# Patient Record
Sex: FEMALE | Race: WHITE | Employment: FULL TIME | URBAN - METROPOLITAN AREA
[De-identification: names, ages, dates, MRNs, and addresses within clinical notes are randomized per-mention and may not be internally consistent; named-entity substitution may affect disease eponyms.]

---

## 2017-03-14 ENCOUNTER — OFFICE VISIT (OUTPATIENT)
Dept: OBGYN CLINIC | Age: 37
End: 2017-03-14
Payer: COMMERCIAL

## 2017-03-14 ENCOUNTER — HOSPITAL ENCOUNTER (OUTPATIENT)
Age: 37
Setting detail: SPECIMEN
Discharge: HOME | End: 2017-03-14
Payer: COMMERCIAL

## 2017-03-14 VITALS
DIASTOLIC BLOOD PRESSURE: 75 MMHG | BODY MASS INDEX: 22.88 KG/M2 | SYSTOLIC BLOOD PRESSURE: 110 MMHG | WEIGHT: 134 LBS | HEIGHT: 64 IN

## 2017-03-14 DIAGNOSIS — Z12.31 ENCOUNTER FOR MAMMOGRAM TO ESTABLISH BASELINE MAMMOGRAM: ICD-10-CM

## 2017-03-14 DIAGNOSIS — Z01.419 ENCOUNTER FOR GYNECOLOGICAL EXAMINATION WITHOUT ABNORMAL FINDING: Primary | ICD-10-CM

## 2017-03-14 PROCEDURE — 99395 PREV VISIT EST AGE 18-39: CPT | Performed by: OBSTETRICS & GYNECOLOGY

## 2017-03-21 LAB — CYTOLOGY REPORT: NORMAL

## 2017-03-29 ENCOUNTER — HOSPITAL ENCOUNTER (OUTPATIENT)
Dept: MAMMOGRAPHY | Age: 37
Discharge: HOME | End: 2017-03-29
Payer: COMMERCIAL

## 2017-03-29 DIAGNOSIS — Z12.31 ENCOUNTER FOR MAMMOGRAM TO ESTABLISH BASELINE MAMMOGRAM: ICD-10-CM

## 2017-03-29 PROCEDURE — 77063 BREAST TOMOSYNTHESIS BI: CPT

## 2017-12-05 RX ORDER — CIPROFLOXACIN 500 MG/1
TABLET, FILM COATED ORAL
Qty: 14 TABLET | Refills: 1 | Status: SHIPPED | OUTPATIENT
Start: 2017-12-05 | End: 2018-11-03 | Stop reason: SDUPTHER

## 2018-03-20 ENCOUNTER — HOSPITAL ENCOUNTER (OUTPATIENT)
Age: 38
Setting detail: SPECIMEN
Discharge: HOME | End: 2018-03-20
Payer: COMMERCIAL

## 2018-03-20 ENCOUNTER — OFFICE VISIT (OUTPATIENT)
Dept: OBGYN CLINIC | Age: 38
End: 2018-03-20
Payer: COMMERCIAL

## 2018-03-20 VITALS
HEIGHT: 64 IN | DIASTOLIC BLOOD PRESSURE: 62 MMHG | SYSTOLIC BLOOD PRESSURE: 102 MMHG | BODY MASS INDEX: 23.22 KG/M2 | WEIGHT: 136 LBS

## 2018-03-20 DIAGNOSIS — Z01.419 ENCOUNTER FOR GYNECOLOGICAL EXAMINATION WITHOUT ABNORMAL FINDING: Primary | ICD-10-CM

## 2018-03-20 DIAGNOSIS — N94.6 DYSMENORRHEA: ICD-10-CM

## 2018-03-20 PROCEDURE — 99395 PREV VISIT EST AGE 18-39: CPT | Performed by: OBSTETRICS & GYNECOLOGY

## 2018-03-20 RX ORDER — NAPROXEN SODIUM 550 MG/1
550 TABLET ORAL 2 TIMES DAILY WITH MEALS
Qty: 60 TABLET | Refills: 3 | Status: SHIPPED | OUTPATIENT
Start: 2018-03-20

## 2018-03-20 NOTE — PATIENT INSTRUCTIONS
I will send a prescription for your menstrual cramps to the pharmacy and begin taking them as directed. We will let you know the results of today's Pap smear. Return to the office in 1 year or as needed. Congratulations and good luck with your new job!

## 2018-03-20 NOTE — PROGRESS NOTES
of the skin revealed no rashes or lesions. Neck:  The neck was supple. There is no tracheal deviation, thyromegaly or supraclavicular adenopathy appreciated. Breast:   The patients breasts were symmetrical.  Breasts are nontender and there  were no masses, discharge or pathologic skin changes. There is no supraclavicular or axillary adenopathy bilaterally. Respiratory: There was unlabored respiratory effort. Lungs clear to ascultation without wheezes, rales or rhonchi in all fields bilaterally. Cardiovascular:  Normal sinus rhythm with a regular rate and without murmur, rubs or gallops. Abdomen: The abdomen was soft and non-tender with no guarding, rebound, CVAT or rigidity. No hernias were appreciated. Bowel sounds were normally active. Pelvic exam:  No vulvar, vaginal or cervical lesions are noted. Normal vaginal discharge present, no significant cystocele, rectocele or enterocele noted. Uterus nongravid and without CMT and adnexa nontender and without abnormal masses bilaterally. Extremities:  FROM and nontender without clubbing cyanosis or edema. ASSESSMENT:         ICD-10-CM ICD-9-CM    1. Encounter for gynecological examination without abnormal finding Z01.419 V72.31 PAP SMEAR   2. Dysmenorrhea N94.6 625.3                    PLAN:  We discussed conservative medical management with NSAIDs, continuous OCPs, patches and Depo-Provera. She is a previous user and Depo-Provera but discontinued due to decreased libido. We also discussed repeat ablation by HTA. She has decided on using NSAIDs and she was prescribed Anaprox DS. Return to the office in 1 year or when necessary. Patient was seen with total face to face time of 20 minutes. Jorge Luis Manzo M.D., Mph  [facility]  [place]

## 2018-04-05 LAB — CYTOLOGY REPORT: NORMAL

## 2018-11-05 RX ORDER — CIPROFLOXACIN 500 MG/1
TABLET, FILM COATED ORAL
Qty: 14 TABLET | Refills: 0 | Status: SHIPPED | OUTPATIENT
Start: 2018-11-05 | End: 2019-07-02 | Stop reason: SDUPTHER

## 2019-04-18 ENCOUNTER — OFFICE VISIT (OUTPATIENT)
Dept: OBGYN CLINIC | Age: 39
End: 2019-04-18
Payer: COMMERCIAL

## 2019-04-18 VITALS
DIASTOLIC BLOOD PRESSURE: 70 MMHG | HEIGHT: 64 IN | SYSTOLIC BLOOD PRESSURE: 120 MMHG | BODY MASS INDEX: 22.88 KG/M2 | WEIGHT: 134 LBS

## 2019-04-18 DIAGNOSIS — Z01.419 ENCOUNTER FOR GYNECOLOGICAL EXAMINATION WITHOUT ABNORMAL FINDING: Primary | ICD-10-CM

## 2019-04-18 PROCEDURE — 99395 PREV VISIT EST AGE 18-39: CPT | Performed by: OBSTETRICS & GYNECOLOGY

## 2019-04-18 ASSESSMENT — PATIENT HEALTH QUESTIONNAIRE - PHQ9
2. FEELING DOWN, DEPRESSED OR HOPELESS: 0
SUM OF ALL RESPONSES TO PHQ QUESTIONS 1-9: 0
1. LITTLE INTEREST OR PLEASURE IN DOING THINGS: 0
SUM OF ALL RESPONSES TO PHQ QUESTIONS 1-9: 0
SUM OF ALL RESPONSES TO PHQ9 QUESTIONS 1 & 2: 0

## 2019-04-18 NOTE — PROGRESS NOTES
DATE OF VISIT:  19        History and Physical    Anamika Bell    :  1980  CHIEF COMPLAINT:    Chief Complaint   Patient presents with    Annual Exam     Here for annual  Last pap 18nl  Last mammogram 17nl                    HPI :   Porfirio Trujillo is a 46 y.o. female. Nulligravida    Porfirio Trujillo returns today for her annual exam.  She has a history of endometrial ablation but continues to have 4-5 days of cyclic bleeding that is very light. Occasionally she does experience some discomfort along with bilateral breast discomfort. She is having regular bowel movements without constipation or diarrhea. She denies any involuntary loss of urine.   She is otherwise without any significant complaints today.  _____________________________________________________________________  Past Medical History:   Diagnosis Date    Painful menstrual periods                                                                    Past Surgical History:   Procedure Laterality Date    DILATION AND CURETTAGE OF UTERUS  14    with balloon ablation and Thermachoice    ENDOMETRIAL ABLATION      attempted only, not proper insrumentation    WISDOM TOOTH EXTRACTION       Family History   Problem Relation Age of Onset    Diabetes Paternal Grandfather     Diabetes Father     Birth Defects Maternal Grandmother         leukemia    Heart Disease Maternal Grandfather      Social History     Tobacco Use   Smoking Status Former Smoker    Packs/day: 1.00    Years: 7.00    Pack years: 7.00    Last attempt to quit: 2007    Years since quittin.2   Smokeless Tobacco Never Used     Social History     Substance and Sexual Activity   Alcohol Use Yes    Alcohol/week: 2.0 oz    Types: 4 Standard drinks or equivalent per week    Comment: 1 time per month     Current Outpatient Medications   Medication Sig Dispense Refill    ciprofloxacin (CIPRO) 500 MG tablet TAKE 1 TABLET BY MOUTH ONCE DAILY PRIOR TO INTERCOURSE AS NEEDED 14 tablet 0    CRANBERRY PO Take  by mouth.  calcium carbonate (OSCAL) 500 MG TABS tablet Take 500 mg by mouth daily.  naproxen sodium (ANAPROX DS) 550 MG tablet Take 1 tablet by mouth 2 times daily (with meals) 60 tablet 3     No current facility-administered medications for this visit. Allergies:  No Known Allergies    Gynecologic History:  Patient's last menstrual period was 2019. Sexually Active: Yes  STD History: No  Abnormal Pap History no  Birth Control: Yes , Davida    OB History    Para Term  AB Living   0 0 0 0 0 0   SAB TAB Ectopic Molar Multiple Live Births   0 0 0 0 0 0     ______________________________________________________________________  REVIEW OF SYSTEMS:        Constitutional:  Unexpected weight change no  Neurological:  Frequent headaches  no  Ophthalmic:  Recent visual changes no  ENT:   Difficulty swallowing  no  Breast:              Masses   no     Respiratory:  Shortness of breath  no    Cardiovascular: Chest pain   no     Gastrointestinal: Chronic diarrhea/constipation no   Urogenital:  Urinary incontinence  no                                         Heavy/irregular periods           no                                      Vaginal discharge                   no  Hematological: Bruises easy   no     Endocrine:  Nipple Discharge  no     Hot/Cold Intolerance  no   Psychological:  Mood and affect were wnl yes                                                                                                                                           Physical Exam:    Vitals:    19 1458   BP: 120/70   Site: Right Upper Arm   Position: Sitting   Cuff Size: Medium Adult   Weight: 134 lb (60.8 kg)   Height: 5' 4\" (1.626 m)       General Appearance:  She does not appear to be in any distress. This  is a well developed, well nourished, well groomed female.         Neurological:  The patient is alert and oriented to time, place, person, and situation without any noted sensory motor deficits. Skin:  A brief inspection of the skin revealed no rashes or lesions. Neck:  The neck was supple. There is no tracheal deviation, thyromegaly or supraclavicular adenopathy appreciated. Breast:   The patients breasts were symmetrical.  Breasts are nontender and there  were no masses, discharge or pathologic skin changes. There is no supraclavicular or axillary adenopathy bilaterally. Respiratory: There was unlabored respiratory effort. Lungs clear to ascultation without wheezes, rales or rhonchi in all fields bilaterally. Cardiovascular:  Normal sinus rhythm with a regular rate and without murmur, rubs or gallops. Abdomen: The abdomen was soft and non-tender with no guarding, rebound, CVAT or rigidity. No hernias were appreciated. Bowel sounds were normally active. Pelvic exam:  No vulvar, vaginal or cervical lesions are noted. Normal vaginal discharge present, no significant cystocele, rectocele or enterocele noted. Uterus nongravid and without CMT and adnexa nontender and without abnormal masses bilaterally. Extremities:  FROM and nontender without clubbing cyanosis or edema. ASSESSMENT:         ICD-10-CM    1. Encounter for gynecological examination without abnormal finding Z01.419 PAP SMEAR                   PLAN:    Return to the office in 1 year or when necessary  Patient was seen with total face to face time of 20 minutes. James Torres M.D., Mph  [facility]  [place]

## 2019-04-18 NOTE — PATIENT INSTRUCTIONS
Two reputable websites are the [employer] of obstetrics and gynecology and [employer].  We will contact you with the results of today's Pap smear. Return to the office in 1 year or as needed. Have a fantastic summer and wonderful year!

## 2019-04-25 DIAGNOSIS — Z01.419 ENCOUNTER FOR GYNECOLOGICAL EXAMINATION WITHOUT ABNORMAL FINDING: ICD-10-CM

## 2019-07-03 RX ORDER — CIPROFLOXACIN 500 MG/1
TABLET, FILM COATED ORAL
Qty: 14 TABLET | Refills: 0 | Status: SHIPPED | OUTPATIENT
Start: 2019-07-03 | End: 2020-01-20

## 2020-01-20 RX ORDER — CIPROFLOXACIN 500 MG/1
TABLET, FILM COATED ORAL
Qty: 14 TABLET | Refills: 0 | Status: SHIPPED | OUTPATIENT
Start: 2020-01-20

## 2022-05-12 ENCOUNTER — OFFICE VISIT (OUTPATIENT)
Dept: PRIMARY CARE CLINIC | Age: 42
End: 2022-05-12
Payer: COMMERCIAL

## 2022-05-12 VITALS
OXYGEN SATURATION: 100 % | HEIGHT: 64 IN | HEART RATE: 120 BPM | WEIGHT: 141.6 LBS | SYSTOLIC BLOOD PRESSURE: 122 MMHG | BODY MASS INDEX: 24.17 KG/M2 | DIASTOLIC BLOOD PRESSURE: 76 MMHG

## 2022-05-12 DIAGNOSIS — R00.2 HEART PALPITATIONS: Primary | ICD-10-CM

## 2022-05-12 DIAGNOSIS — R00.2 HEART PALPITATIONS: ICD-10-CM

## 2022-05-12 LAB
ABSOLUTE EOS #: 0.31 K/UL (ref 0–0.44)
ABSOLUTE IMMATURE GRANULOCYTE: 0.03 K/UL (ref 0–0.3)
ABSOLUTE LYMPH #: 1.88 K/UL (ref 1.1–3.7)
ABSOLUTE MONO #: 0.63 K/UL (ref 0.1–1.2)
BASOPHILS # BLD: 1 % (ref 0–2)
BASOPHILS ABSOLUTE: 0.06 K/UL (ref 0–0.2)
DIFFERENTIAL TYPE: ABNORMAL
EOSINOPHILS RELATIVE PERCENT: 4 % (ref 1–4)
HCT VFR BLD CALC: 39.9 % (ref 36.3–47.1)
HEMOGLOBIN: 13.4 G/DL (ref 11.9–15.1)
IMMATURE GRANULOCYTES: 0 %
LYMPHOCYTES # BLD: 21 % (ref 24–43)
MCH RBC QN AUTO: 33.3 PG (ref 25.2–33.5)
MCHC RBC AUTO-ENTMCNC: 33.6 G/DL (ref 28.4–34.8)
MCV RBC AUTO: 99.3 FL (ref 82.6–102.9)
MONOCYTES # BLD: 7 % (ref 3–12)
NRBC AUTOMATED: 0 PER 100 WBC
PDW BLD-RTO: 11.4 % (ref 11.8–14.4)
PLATELET # BLD: 287 K/UL (ref 138–453)
PLATELET ESTIMATE: ABNORMAL
PMV BLD AUTO: 8.8 FL (ref 8.1–13.5)
RBC # BLD: 4.02 M/UL (ref 3.95–5.11)
RBC # BLD: ABNORMAL 10*6/UL
SEG NEUTROPHILS: 67 % (ref 36–65)
SEGMENTED NEUTROPHILS ABSOLUTE COUNT: 6.02 K/UL (ref 1.5–8.1)
TSH SERPL DL<=0.05 MIU/L-ACNC: 1.53 UIU/ML (ref 0.3–5)
WBC # BLD: 8.9 K/UL (ref 3.5–11.3)
WBC # BLD: ABNORMAL 10*3/UL

## 2022-05-12 PROCEDURE — G8420 CALC BMI NORM PARAMETERS: HCPCS | Performed by: FAMILY MEDICINE

## 2022-05-12 PROCEDURE — 1036F TOBACCO NON-USER: CPT | Performed by: FAMILY MEDICINE

## 2022-05-12 PROCEDURE — G8427 DOCREV CUR MEDS BY ELIG CLIN: HCPCS | Performed by: FAMILY MEDICINE

## 2022-05-12 PROCEDURE — 99213 OFFICE O/P EST LOW 20 MIN: CPT | Performed by: FAMILY MEDICINE

## 2022-05-12 SDOH — ECONOMIC STABILITY: FOOD INSECURITY: WITHIN THE PAST 12 MONTHS, YOU WORRIED THAT YOUR FOOD WOULD RUN OUT BEFORE YOU GOT MONEY TO BUY MORE.: NEVER TRUE

## 2022-05-12 SDOH — ECONOMIC STABILITY: FOOD INSECURITY: WITHIN THE PAST 12 MONTHS, THE FOOD YOU BOUGHT JUST DIDN'T LAST AND YOU DIDN'T HAVE MONEY TO GET MORE.: NEVER TRUE

## 2022-05-12 ASSESSMENT — PATIENT HEALTH QUESTIONNAIRE - PHQ9
2. FEELING DOWN, DEPRESSED OR HOPELESS: 0
SUM OF ALL RESPONSES TO PHQ QUESTIONS 1-9: 0
SUM OF ALL RESPONSES TO PHQ9 QUESTIONS 1 & 2: 0
SUM OF ALL RESPONSES TO PHQ QUESTIONS 1-9: 0
1. LITTLE INTEREST OR PLEASURE IN DOING THINGS: 0
SUM OF ALL RESPONSES TO PHQ QUESTIONS 1-9: 0
SUM OF ALL RESPONSES TO PHQ QUESTIONS 1-9: 0

## 2022-05-12 ASSESSMENT — ENCOUNTER SYMPTOMS
VOMITING: 0
RHINORRHEA: 0
SHORTNESS OF BREATH: 0
WHEEZING: 0
COUGH: 0
NAUSEA: 0
EYE REDNESS: 0
ABDOMINAL PAIN: 0
DIARRHEA: 0
SORE THROAT: 0
EYE DISCHARGE: 0

## 2022-05-12 ASSESSMENT — SOCIAL DETERMINANTS OF HEALTH (SDOH): HOW HARD IS IT FOR YOU TO PAY FOR THE VERY BASICS LIKE FOOD, HOUSING, MEDICAL CARE, AND HEATING?: NOT HARD AT ALL

## 2022-05-12 NOTE — PROGRESS NOTES
[facility] CARE  [address] [facility]  [address] [address]  Dept: [phone]    Mindi Prescott is a 40 y.o. female Established patient, who presents today for her medical conditions/complaints as noted below. Chief Complaint   Patient presents with    Palpitations     Patient had covid in December. Palpitations started in January. Patient stopped drinking caffine. Palpitations happen about 1 every other day        HPI:     HPI- pt having palps- caffeine makes it worse, decongestants make it worse. Notices it pounding mostly and sometimes notices some skipping, adelfo with exacerbating symptoms. No lightheadedness or dizziness. Can go anywhere from a minute with activity to 15 minutes. Reviewed prior notes: None   Reviewed previous:  Labs and Imaging    No results found for: LDLCHOLESTEROL, LDLCALC    (goal LDL is <100)   No results found for: AST, ALT, BUN, LABA1C, TSH  BP Readings from Last 3 Encounters:   05/12/22 122/76   04/18/19 120/70   03/20/18 102/62          (goal 120/80)    Past Medical History:   Diagnosis Date    Painful menstrual periods       Past Surgical History:   Procedure Laterality Date    DILATION AND CURETTAGE OF UTERUS  5/9/14    with balloon ablation and Thermachoice    ENDOMETRIAL ABLATION      attempted only, not proper insrumentation    WISDOM TOOTH EXTRACTION         Family History   Problem Relation Age of Onset    Diabetes Paternal Grandfather     Diabetes Father     Birth Defects Maternal Grandmother         leukemia    Heart Disease Maternal Grandfather        Social History     Tobacco Use    Smoking status: Former Smoker     Packs/day: 1.00     Years: 7.00     Pack years: 7.00     Quit date: 1/9/2007     Years since quitting: 15.3    Smokeless tobacco: Never Used   Substance Use Topics    Alcohol use:  Yes     Alcohol/week: 3.3 standard drinks     Types: 4 Standard drinks or equivalent per week     Comment: 1 time per month      Current Outpatient Medications   Medication Sig Dispense Refill    CRANBERRY PO Take  by mouth.  calcium carbonate (OSCAL) 500 MG TABS tablet Take 500 mg by mouth daily.  ciprofloxacin (CIPRO) 500 MG tablet TAKE 1 TABLET BY MOUTH ONE TIME A DAY prior to intercourse AS NEEDED (Patient not taking: Reported on 5/12/2022) 14 tablet 0    naproxen sodium (ANAPROX DS) 550 MG tablet Take 1 tablet by mouth 2 times daily (with meals) (Patient not taking: Reported on 5/12/2022) 60 tablet 3     No current facility-administered medications for this visit. No Known Allergies    Health Maintenance   Topic Date Due    Varicella vaccine (1 of 2 - 2-dose childhood series) Never done    Depression Screen  Never done    HIV screen  Never done    Hepatitis C screen  Never done    Lipids  Never done    Cervical cancer screen  04/24/2022    Flu vaccine (Season Ended) 09/01/2022    DTaP/Tdap/Td vaccine (2 - Td or Tdap) 07/17/2024    COVID-19 Vaccine  Completed    Hepatitis A vaccine  Aged Out    Hepatitis B vaccine  Aged Out    Hib vaccine  Aged Out    Meningococcal (ACWY) vaccine  Aged Out    Pneumococcal 0-64 years Vaccine  Aged Out       Subjective:      Review of Systems   Constitutional: Negative for chills and fever. HENT: Negative for rhinorrhea and sore throat. Eyes: Negative for discharge and redness. Respiratory: Negative for cough, shortness of breath and wheezing. Cardiovascular: Positive for palpitations. Negative for chest pain. Gastrointestinal: Negative for abdominal pain, diarrhea, nausea and vomiting. Genitourinary: Negative for dysuria and frequency. Musculoskeletal: Negative for arthralgias and myalgias. Neurological: Negative for dizziness, light-headedness and headaches. Psychiatric/Behavioral: Negative for sleep disturbance.        Objective:     /76   Pulse 120   Ht 5' 4\" (1.626 m)   Wt 141 lb 9.6 oz (64.2 kg)   SpO2 100%   BMI 24.31 kg/m² Physical Exam  Vitals and nursing note reviewed. Constitutional:       General: She is not in acute distress. Appearance: She is well-developed. She is not ill-appearing. HENT:      Head: Normocephalic and atraumatic. Right Ear: External ear normal.      Left Ear: External ear normal.   Eyes:      General: No scleral icterus. Right eye: No discharge. Left eye: No discharge. Conjunctiva/sclera: Conjunctivae normal.   Neck:      Thyroid: No thyromegaly. Trachea: No tracheal deviation. Cardiovascular:      Rate and Rhythm: Normal rate and regular rhythm. Heart sounds: Normal heart sounds. Pulmonary:      Effort: Pulmonary effort is normal. No respiratory distress. Breath sounds: Normal breath sounds. No wheezing. Lymphadenopathy:      Cervical: No cervical adenopathy. Skin:     General: Skin is warm. Findings: No rash. Neurological:      Mental Status: She is alert and oriented to person, place, and time. Psychiatric:         Mood and Affect: Mood normal.         Behavior: Behavior normal.         Thought Content: Thought content normal.         Assessment/Plan:   1. Heart palpitations  -     ECHO Complete 2D W Doppler W Color; Future  -     Holter Monitor 48 Hour; Future  -     CBC with Auto Differential; Future  -     Comprehensive Metabolic Panel; Future  -     TSH; Future     Avoid decongetants and caffeine    Return in about 6 weeks (around 6/23/2022).   Data Unavailable     Orders Placed This Encounter   Procedures    CBC with Auto Differential     Standing Status:   Future     Standing Expiration Date:   5/13/2023    Comprehensive Metabolic Panel     Standing Status:   Future     Standing Expiration Date:   5/13/2023    TSH     Standing Status:   Future     Standing Expiration Date:   5/13/2023    Holter Monitor 48 Hour     Standing Status:   Future     Standing Expiration Date:   5/12/2023     Order Specific Question:   Reason for Exam? Answer:   Irregular heart rate    ECHO Complete 2D W Doppler W Color     Standing Status:   Future     Standing Expiration Date:   5/12/2023     Order Specific Question:   Reason for exam:     Answer:   palpatations     No orders of the defined types were placed in this encounter. Patient given educational materials - see patient instructions. Discussed use, benefit, and side effects of prescribed medications. All patient questions answered. Pt voiced understanding. Reviewed health maintenance. Instructed to continue current medications, diet and exercise. Patient agreed with treatment plan. Follow up as directed.      Electronically signed by Dottie Ramirez MD on 5/12/2022 at 1:25 PM

## 2022-05-13 LAB
ALBUMIN SERPL-MCNC: 4.9 G/DL (ref 3.5–5.2)
ALBUMIN/GLOBULIN RATIO: 1.8 (ref 1–2.5)
ALP BLD-CCNC: 66 U/L (ref 35–104)
ALT SERPL-CCNC: 23 U/L (ref 5–33)
ANION GAP SERPL CALCULATED.3IONS-SCNC: 15 MMOL/L (ref 9–17)
AST SERPL-CCNC: 19 U/L
BILIRUB SERPL-MCNC: 0.52 MG/DL (ref 0.3–1.2)
BUN BLDV-MCNC: 12 MG/DL (ref 6–20)
BUN/CREAT BLD: ABNORMAL (ref 9–20)
CALCIUM SERPL-MCNC: 9.9 MG/DL (ref 8.6–10.4)
CHLORIDE BLD-SCNC: 98 MMOL/L (ref 98–107)
CO2: 24 MMOL/L (ref 20–31)
CREAT SERPL-MCNC: 0.71 MG/DL (ref 0.5–0.9)
GFR AFRICAN AMERICAN: >60 ML/MIN
GFR NON-AFRICAN AMERICAN: >60 ML/MIN
GFR SERPL CREATININE-BSD FRML MDRD: ABNORMAL ML/MIN/{1.73_M2}
GFR SERPL CREATININE-BSD FRML MDRD: ABNORMAL ML/MIN/{1.73_M2}
GLUCOSE BLD-MCNC: 130 MG/DL (ref 70–99)
POTASSIUM SERPL-SCNC: 4.2 MMOL/L (ref 3.7–5.3)
SODIUM BLD-SCNC: 137 MMOL/L (ref 135–144)
TOTAL PROTEIN: 7.6 G/DL (ref 6.4–8.3)

## 2022-06-17 ENCOUNTER — HOSPITAL ENCOUNTER (OUTPATIENT)
Dept: NON INVASIVE DIAGNOSTICS | Age: 42
Discharge: HOME | End: 2022-06-17
Payer: COMMERCIAL

## 2022-06-17 DIAGNOSIS — R00.2 HEART PALPITATIONS: ICD-10-CM

## 2022-06-17 LAB
LV EF: 65 %
LVEF MODALITY: NORMAL

## 2022-06-17 PROCEDURE — 93306 TTE W/DOPPLER COMPLETE: CPT

## 2022-06-17 PROCEDURE — 93226 XTRNL ECG REC<48 HR SCAN A/R: CPT

## 2022-06-17 PROCEDURE — 93225 XTRNL ECG REC<48 HRS REC: CPT

## 2022-06-22 LAB
ACQUISITION DURATION: NORMAL S
AVERAGE HEART RATE: 93 BPM
HOOKUP DATE: NORMAL
HOOKUP TIME: NORMAL
MAX HEART RATE TIME/DATE: NORMAL
MAX HEART RATE: 139 BPM
MIN HEART RATE TIME/DATE: NORMAL
MIN HEART RATE: 72 BPM
NUMBER OF QRS COMPLEXES: NORMAL
NUMBER OF SUPRAVENTRICULAR COUPLETS: 0
NUMBER OF SUPRAVENTRICULAR ECTOPICS: 27
NUMBER OF SUPRAVENTRICULAR ISOLATED BEATS: 20
NUMBER OF VENTRICULAR BIGEMINAL CYCLES: 0
NUMBER OF VENTRICULAR COUPLETS: 0
NUMBER OF VENTRICULAR ECTOPICS: 0

## 2022-06-27 ENCOUNTER — OFFICE VISIT (OUTPATIENT)
Dept: PRIMARY CARE CLINIC | Age: 42
End: 2022-06-27
Payer: COMMERCIAL

## 2022-06-27 VITALS
DIASTOLIC BLOOD PRESSURE: 82 MMHG | SYSTOLIC BLOOD PRESSURE: 120 MMHG | BODY MASS INDEX: 24.17 KG/M2 | WEIGHT: 140.8 LBS | HEART RATE: 102 BPM | OXYGEN SATURATION: 99 %

## 2022-06-27 DIAGNOSIS — R00.2 PALPITATION: Primary | ICD-10-CM

## 2022-06-27 PROCEDURE — 1036F TOBACCO NON-USER: CPT | Performed by: FAMILY MEDICINE

## 2022-06-27 PROCEDURE — 99213 OFFICE O/P EST LOW 20 MIN: CPT | Performed by: FAMILY MEDICINE

## 2022-06-27 PROCEDURE — G8427 DOCREV CUR MEDS BY ELIG CLIN: HCPCS | Performed by: FAMILY MEDICINE

## 2022-06-27 PROCEDURE — G8420 CALC BMI NORM PARAMETERS: HCPCS | Performed by: FAMILY MEDICINE

## 2022-06-27 NOTE — PROGRESS NOTES
[facility] CARE  [address] [address]  Dept: [phone]    Lydia Pierson is a 40 y.o. female Established patient, who presents today for her medical conditions/complaints as noted below. Chief Complaint   Patient presents with    Results     Patient would like to discuss labs and holter monitor     Palpitations       HPI:     HPI- test results review. Had some flutters or pounding when she had testing. Reviewed prior notes: None   Reviewed previous:  Labs and Imaging    No results found for: LDLCHOLESTEROL, LDLCALC    (goal LDL is <100)   AST (U/L)   Date Value   05/12/2022 19     ALT (U/L)   Date Value   05/12/2022 23     BUN (mg/dL)   Date Value   05/12/2022 12     TSH (uIU/mL)   Date Value   05/12/2022 1.53     BP Readings from Last 3 Encounters:   06/27/22 120/82   05/12/22 122/76   04/18/19 120/70          (goal 120/80)    Past Medical History:   Diagnosis Date    Painful menstrual periods       Past Surgical History:   Procedure Laterality Date    DILATION AND CURETTAGE OF UTERUS  5/9/14    with balloon ablation and Thermachoice    ENDOMETRIAL ABLATION      attempted only, not proper insrumentation    WISDOM TOOTH EXTRACTION         Family History   Problem Relation Age of Onset    Diabetes Paternal Grandfather     Diabetes Father     Birth Defects Maternal Grandmother         leukemia    Heart Disease Maternal Grandfather        Social History     Tobacco Use    Smoking status: Former Smoker     Packs/day: 1.00     Years: 7.00     Pack years: 7.00     Quit date: 1/9/2007     Years since quitting: 15.4    Smokeless tobacco: Never Used   Substance Use Topics    Alcohol use: Yes     Alcohol/week: 3.3 standard drinks     Types: 4 Standard drinks or equivalent per week     Comment: 1 time per month      Current Outpatient Medications   Medication Sig Dispense Refill    CRANBERRY PO Take  by mouth.       calcium carbonate (OSCAL) 500 MG TABS tablet Take 500 mg by mouth daily.  ciprofloxacin (CIPRO) 500 MG tablet TAKE 1 TABLET BY MOUTH ONE TIME A DAY prior to intercourse AS NEEDED (Patient not taking: Reported on 5/12/2022) 14 tablet 0    naproxen sodium (ANAPROX DS) 550 MG tablet Take 1 tablet by mouth 2 times daily (with meals) (Patient not taking: Reported on 5/12/2022) 60 tablet 3     No current facility-administered medications for this visit. No Known Allergies    Health Maintenance   Topic Date Due    Varicella vaccine (1 of 2 - 2-dose childhood series) Never done    HIV screen  Never done    Hepatitis C screen  Never done    Lipids  Never done    Cervical cancer screen  04/24/2022    Flu vaccine (Season Ended) 09/01/2022    Depression Screen  05/12/2023    DTaP/Tdap/Td vaccine (2 - Td or Tdap) 07/17/2024    COVID-19 Vaccine  Completed    Hepatitis A vaccine  Aged Out    Hepatitis B vaccine  Aged Out    Hib vaccine  Aged Out    Meningococcal (ACWY) vaccine  Aged Out    Pneumococcal 0-64 years Vaccine  Aged Out       Subjective:      Review of Systems    Objective:     /82   Pulse (!) 102   Wt 140 lb 12.8 oz (63.9 kg)   SpO2 99%   BMI 24.17 kg/m²   Physical Exam  Vitals and nursing note reviewed. Constitutional:       Appearance: Normal appearance. HENT:      Head: Normocephalic and atraumatic. Eyes:      General: No scleral icterus. Right eye: No discharge. Conjunctiva/sclera: Conjunctivae normal.   Pulmonary:      Effort: Pulmonary effort is normal. No respiratory distress. Skin:     Coloration: Skin is not jaundiced or pale. Neurological:      General: No focal deficit present. Mental Status: She is alert. Psychiatric:         Mood and Affect: Mood normal.         Behavior: Behavior normal.         Thought Content: Thought content normal.         Assessment/Plan:   1.  Palpitation  Assessment & Plan:   discussed with pt results of tests and that symptoms were not correlating to her extra systoles. Reassured pt. Return if symptoms worsen or fail to improve. Data Unavailable     No orders of the defined types were placed in this encounter. No orders of the defined types were placed in this encounter. Patient given educational materials - see patient instructions. Discussed use, benefit, and side effects of prescribed medications. All patient questions answered. Pt voiced understanding. Reviewed health maintenance. Instructed to continue current medications, diet and exercise. Patient agreed with treatment plan. Follow up as directed.      Electronically signed by Neli Myers MD on 6/27/2022 at 9:19 AM

## 2022-10-21 ENCOUNTER — IMMUNIZATION (OUTPATIENT)
Dept: PRIMARY CARE CLINIC | Age: 42
End: 2022-10-21
Payer: COMMERCIAL

## 2022-10-21 ENCOUNTER — HOSPITAL ENCOUNTER (OUTPATIENT)
Age: 42
Discharge: HOME | End: 2022-10-23
Payer: COMMERCIAL

## 2022-10-21 ENCOUNTER — HOSPITAL ENCOUNTER (OUTPATIENT)
Dept: GENERAL RADIOLOGY | Age: 42
Discharge: HOME | End: 2022-10-23
Payer: COMMERCIAL

## 2022-10-21 ENCOUNTER — OFFICE VISIT (OUTPATIENT)
Dept: PRIMARY CARE CLINIC | Age: 42
End: 2022-10-21
Payer: COMMERCIAL

## 2022-10-21 VITALS
OXYGEN SATURATION: 99 % | BODY MASS INDEX: 24.24 KG/M2 | WEIGHT: 141.2 LBS | SYSTOLIC BLOOD PRESSURE: 120 MMHG | DIASTOLIC BLOOD PRESSURE: 80 MMHG | HEART RATE: 109 BPM

## 2022-10-21 DIAGNOSIS — G89.29 CHRONIC PAIN OF RIGHT KNEE: ICD-10-CM

## 2022-10-21 DIAGNOSIS — M25.561 CHRONIC PAIN OF RIGHT KNEE: ICD-10-CM

## 2022-10-21 DIAGNOSIS — Z23 NEED FOR VACCINATION: Primary | ICD-10-CM

## 2022-10-21 PROCEDURE — G8420 CALC BMI NORM PARAMETERS: HCPCS | Performed by: FAMILY MEDICINE

## 2022-10-21 PROCEDURE — 91312 COVID-19, PFIZER BIVALENT BOOSTER, (AGE 12Y+), IM, 30 MCG/0.3 ML: CPT | Performed by: FAMILY MEDICINE

## 2022-10-21 PROCEDURE — G8484 FLU IMMUNIZE NO ADMIN: HCPCS | Performed by: FAMILY MEDICINE

## 2022-10-21 PROCEDURE — 0124A COVID-19, PFIZER BIVALENT BOOSTER, (AGE 12Y+), IM, 30 MCG/0.3 ML: CPT | Performed by: FAMILY MEDICINE

## 2022-10-21 PROCEDURE — 1036F TOBACCO NON-USER: CPT | Performed by: FAMILY MEDICINE

## 2022-10-21 PROCEDURE — 99213 OFFICE O/P EST LOW 20 MIN: CPT | Performed by: FAMILY MEDICINE

## 2022-10-21 PROCEDURE — G8427 DOCREV CUR MEDS BY ELIG CLIN: HCPCS | Performed by: FAMILY MEDICINE

## 2022-10-21 PROCEDURE — 73564 X-RAY EXAM KNEE 4 OR MORE: CPT

## 2022-10-21 NOTE — PROGRESS NOTES
After obtaining consent, and per orders of Dr. Shantelle Fernández, injection of covid booster given in Left deltoid by Shaun Mosqueda MA. Patient instructed to remain in clinic for 15 minutes afterwards, and to report any adverse reaction to me immediately. Patient reported no reactions 15 minutes after waiting and tolerated well.

## 2022-10-21 NOTE — PROGRESS NOTES
[facility] CARE  [address] [address]  [address] [address]  Dept: [phone]    Davide Brady is a 40 y.o. female Established patient, who presents today for her medical conditions/complaints as noted below. Chief Complaint   Patient presents with    Knee Pain     Patient c/o right knee pain x 1 year but getting worse. She stated that it is bad when going up and down stairs. HPI:     HPI- knee is bothering her. Getting worse over the past year. Pain is in the front on the side of the kneecap. No swelling. No giving out, but sometimes has to go up steps one at a time. Steps are bad. Has tried therapy exercises, but really has not helped. Has not taken anything for it- is not bad enough- really only hurts when she does something that aggravates it.       Reviewed prior notes: None   Reviewed previous:  Imaging    No results found for: LDLCHOLESTEROL, LDLCALC    (goal LDL is <100)   AST (U/L)   Date Value   05/12/2022 19     ALT (U/L)   Date Value   05/12/2022 23     BUN (mg/dL)   Date Value   05/12/2022 12     TSH (uIU/mL)   Date Value   05/12/2022 1.53     BP Readings from Last 3 Encounters:   10/21/22 120/80   06/27/22 120/82   05/12/22 122/76          (goal 120/80)    Past Medical History:   Diagnosis Date    Painful menstrual periods       Past Surgical History:   Procedure Laterality Date    DILATION AND CURETTAGE OF UTERUS  5/9/14    with balloon ablation and Thermachoice    ENDOMETRIAL ABLATION      attempted only, not proper insrumentation    WISDOM TOOTH EXTRACTION         Family History   Problem Relation Age of Onset    Diabetes Paternal Grandfather     Diabetes Father     Birth Defects Maternal Grandmother         leukemia    Heart Disease Maternal Grandfather        Social History     Tobacco Use    Smoking status: Former     Packs/day: 1.00     Years: 7.00     Pack years: 7.00     Types: Cigarettes     Quit date: 1/9/2007     Years since quitting: 15.7    Smokeless tobacco: Never   Substance Use Topics    Alcohol use: Yes     Alcohol/week: 3.3 standard drinks     Types: 4 Standard drinks or equivalent per week     Comment: 1 time per month      Current Outpatient Medications   Medication Sig Dispense Refill    CRANBERRY PO Take  by mouth.      calcium carbonate (OSCAL) 500 MG TABS tablet Take 500 mg by mouth daily. ciprofloxacin (CIPRO) 500 MG tablet TAKE 1 TABLET BY MOUTH ONE TIME A DAY prior to intercourse AS NEEDED (Patient not taking: No sig reported) 14 tablet 0    naproxen sodium (ANAPROX DS) 550 MG tablet Take 1 tablet by mouth 2 times daily (with meals) (Patient not taking: No sig reported) 60 tablet 3     No current facility-administered medications for this visit. No Known Allergies    Health Maintenance   Topic Date Due    Varicella vaccine (1 of 2 - 2-dose childhood series) Never done    HIV screen  Never done    Hepatitis C screen  Never done    Lipids  Never done    Cervical cancer screen  04/24/2022    Flu vaccine (1) 10/21/2023 (Originally 8/1/2022)    COVID-19 Vaccine (5 - Booster for Pfizer series) 12/16/2022    Depression Screen  05/12/2023    DTaP/Tdap/Td vaccine (2 - Td or Tdap) 07/17/2024    Hepatitis A vaccine  Aged Out    Hib vaccine  Aged Out    Meningococcal (ACWY) vaccine  Aged Out    Pneumococcal 0-64 years Vaccine  Aged Out       Subjective:      Review of Systems   Constitutional:  Negative for chills and fever. Musculoskeletal:  Negative for joint swelling (no eryth or swelling) and myalgias. Skin:  Negative for rash. Neurological:  Negative for weakness and numbness. Hematological:  Does not bruise/bleed easily. Objective:     /80   Pulse (!) 109   Wt 141 lb 3.2 oz (64 kg)   SpO2 99%   BMI 24.24 kg/m²   Physical Exam  Vitals and nursing note reviewed. Constitutional:       Appearance: Normal appearance. HENT:      Head: Normocephalic and atraumatic.    Eyes:      General: No scleral icterus. Right eye: No discharge. Conjunctiva/sclera: Conjunctivae normal.   Pulmonary:      Effort: Pulmonary effort is normal. No respiratory distress. Musculoskeletal:         General: No swelling or signs of injury. Right lower leg: No edema. Left lower leg: No edema. Comments: Right leg-No lig laxiety, no swelling or tenderness except over medical joint line. No patellar apprehension. Minimal crepitus bilaterally. Skin:     Coloration: Skin is not jaundiced or pale. Neurological:      General: No focal deficit present. Mental Status: She is alert. Psychiatric:         Mood and Affect: Mood normal.         Behavior: Behavior normal.         Thought Content: Thought content normal.       Assessment/Plan:   1. Chronic pain of right knee  Assessment & Plan:   xray with sunrise view- OA vs PFS- therapy exercises not helping. will send to therapy. topical pain meds as needed. Orders:  -     XR KNEE RIGHT (3 VIEWS); Future  -     [address]     Return if symptoms worsen or fail to improve. Data Unavailable     Orders Placed This Encounter   Procedures    XR KNEE RIGHT (3 VIEWS)     Standing Status:   Future     Standing Expiration Date:   10/21/2023     Order Specific Question:   Reason for exam:     Answer:   please include sunrise view    [address]     Referral Priority:   Routine     Referral Type:   Eval and Treat     Referral Reason:   Specialty Services Required     Requested Specialty:   Physical Therapist     Number of Visits Requested:   1       No orders of the defined types were placed in this encounter. Patient given educational materials - see patient instructions. Discussed use, benefit, and side effects of prescribed medications. All patient questions answered. Pt voiced understanding. Reviewed health maintenance. Instructed to continue current medications, diet and exercise.   Patient agreed with treatment plan. Follow up as directed.      Electronically signed by Marcia Martinez MD on 10/21/2022 at 8:49 AM

## 2022-10-21 NOTE — ASSESSMENT & PLAN NOTE
xray with sunrise view- OA vs PFS- therapy exercises not helping. will send to therapy. topical pain meds as needed.

## 2022-10-24 NOTE — RESULT ENCOUNTER NOTE
Adv pt results show some arthritis in knee joint including under the kneecap like we discussed at her visit. Looks mild and can use the topical meds as  needed for now.

## 2023-02-03 ENCOUNTER — OFFICE VISIT (OUTPATIENT)
Dept: PRIMARY CARE CLINIC | Age: 43
End: 2023-02-03
Payer: COMMERCIAL

## 2023-02-03 VITALS
WEIGHT: 139.8 LBS | SYSTOLIC BLOOD PRESSURE: 118 MMHG | DIASTOLIC BLOOD PRESSURE: 66 MMHG | HEIGHT: 64 IN | BODY MASS INDEX: 23.87 KG/M2 | OXYGEN SATURATION: 99 % | HEART RATE: 109 BPM

## 2023-02-03 DIAGNOSIS — G89.29 CHRONIC PAIN OF RIGHT KNEE: Primary | ICD-10-CM

## 2023-02-03 DIAGNOSIS — R55 NEAR SYNCOPE: ICD-10-CM

## 2023-02-03 DIAGNOSIS — M25.561 CHRONIC PAIN OF RIGHT KNEE: Primary | ICD-10-CM

## 2023-02-03 DIAGNOSIS — Z13.220 SCREENING FOR LIPID DISORDERS: ICD-10-CM

## 2023-02-03 PROCEDURE — G8420 CALC BMI NORM PARAMETERS: HCPCS | Performed by: PHYSICIAN ASSISTANT

## 2023-02-03 PROCEDURE — 1036F TOBACCO NON-USER: CPT | Performed by: PHYSICIAN ASSISTANT

## 2023-02-03 PROCEDURE — G8484 FLU IMMUNIZE NO ADMIN: HCPCS | Performed by: PHYSICIAN ASSISTANT

## 2023-02-03 PROCEDURE — 99214 OFFICE O/P EST MOD 30 MIN: CPT | Performed by: PHYSICIAN ASSISTANT

## 2023-02-03 PROCEDURE — G8427 DOCREV CUR MEDS BY ELIG CLIN: HCPCS | Performed by: PHYSICIAN ASSISTANT

## 2023-02-03 RX ORDER — CALCIUM CARBONATE/VITAMIN D3 500 MG-10
500 TABLET ORAL DAILY
COMMUNITY

## 2023-02-03 ASSESSMENT — ENCOUNTER SYMPTOMS
COUGH: 0
SHORTNESS OF BREATH: 0
WHEEZING: 0

## 2023-02-03 ASSESSMENT — PATIENT HEALTH QUESTIONNAIRE - PHQ9
SUM OF ALL RESPONSES TO PHQ QUESTIONS 1-9: 0
2. FEELING DOWN, DEPRESSED OR HOPELESS: 0

## 2023-02-03 NOTE — PROGRESS NOTES
[facility] PRIMARY CARE  [address] [address]  [address] [address]  Dept: [phone]    Anabel Valente is a 44 y.o. female Established patient, who presents today for her medical conditions/complaints as noted below. Chief Complaint   Patient presents with    Knee Pain     Patient has completed therapy and still having pain in right knee        HPI:     HPI: The patient has been having pain in knee right knee for a few years. Was just during exercise. Has been trying some exercises without relief. Front medial side worst with squats. Has done PT at [facility] point has done 8 visits. Without improvement. XR showed mild arthritis otherwise okay. Stairs are painful. Got PAP in [place] a couple weeks ago. Did have episode of light headedness. Has had holter monitor which was normal. Believes could be from hydration or low sugar.      Reviewed prior notes None  Reviewed previous Labs    No results found for: LDLCHOLESTEROL, LDLCALC    (goal LDL is <100)   AST (U/L)   Date Value   05/12/2022 19     ALT (U/L)   Date Value   05/12/2022 23     BUN (mg/dL)   Date Value   05/12/2022 12     TSH (uIU/mL)   Date Value   05/12/2022 1.53     BP Readings from Last 3 Encounters:   02/03/23 118/66   10/21/22 120/80   06/27/22 120/82          (goal 120/80)  No results found for: LABA1C  Past Medical History:   Diagnosis Date    Painful menstrual periods       Past Surgical History:   Procedure Laterality Date    DILATION AND CURETTAGE OF UTERUS  5/9/14    with balloon ablation and Thermachoice    ENDOMETRIAL ABLATION      attempted only, not proper insrumentation    WISDOM TOOTH EXTRACTION         Family History   Problem Relation Age of Onset    Diabetes Paternal Grandfather     Diabetes Father     Birth Defects Maternal Grandmother         leukemia    Heart Disease Maternal Grandfather        Social History     Tobacco Use    Smoking status: Former     Packs/day: 1.00     Years: 7.00 Pack years: 7.00     Types: Cigarettes     Quit date: 2007     Years since quittin.0    Smokeless tobacco: Never   Substance Use Topics    Alcohol use: Yes     Alcohol/week: 3.3 standard drinks     Types: 4 Standard drinks or equivalent per week     Comment: 1 time per month      Current Outpatient Medications   Medication Sig Dispense Refill    Calcium Carb-Cholecalciferol 500-10 MG-MCG TABS Take 500 mg by mouth daily      CRANBERRY PO Take  by mouth.      calcium carbonate (OSCAL) 500 MG TABS tablet Take 500 mg by mouth daily. No current facility-administered medications for this visit. No Known Allergies    Health Maintenance   Topic Date Due    Varicella vaccine (1 of 2 - 2-dose childhood series) Never done    HIV screen  Never done    Hepatitis C screen  Never done    Lipids  Never done    Cervical cancer screen  2022    Flu vaccine (1) 10/21/2023 (Originally 2022)    Depression Screen  2023    DTaP/Tdap/Td vaccine (2 - Td or Tdap) 2024    COVID-19 Vaccine  Completed    Hepatitis A vaccine  Aged Out    Hib vaccine  Aged Out    Meningococcal (ACWY) vaccine  Aged Out    Pneumococcal 0-64 years Vaccine  Aged Out       Subjective:      Review of Systems   Constitutional:  Negative for chills and fever. Respiratory:  Negative for cough, shortness of breath and wheezing. Cardiovascular:  Negative for chest pain and palpitations. Musculoskeletal:  Positive for arthralgias and gait problem. Negative for joint swelling. Skin:  Negative for rash. Hematological:  Negative for adenopathy. Psychiatric/Behavioral:  Negative for agitation and dysphoric mood. The patient is not nervous/anxious. Objective:     /66   Pulse (!) 109   Ht 5' 4\" (1.626 m)   Wt 139 lb 12.8 oz (63.4 kg)   SpO2 99%   BMI 24.00 kg/m²   Physical Exam  Constitutional:       General: She is not in acute distress. Appearance: Normal appearance. She is not ill-appearing.    HENT: Head: Normocephalic and atraumatic. Right Ear: External ear normal.      Left Ear: External ear normal.      Nose: Nose normal.      Mouth/Throat:      Mouth: Mucous membranes are moist.   Neck:      Vascular: No carotid bruit. Cardiovascular:      Rate and Rhythm: Normal rate and regular rhythm. Pulses: Normal pulses. Heart sounds: Normal heart sounds. Pulmonary:      Effort: Pulmonary effort is normal. No respiratory distress. Breath sounds: Normal breath sounds. Musculoskeletal:         General: No swelling or signs of injury. Normal range of motion. Cervical back: Normal range of motion and neck supple. Lymphadenopathy:      Cervical: No cervical adenopathy. Skin:     General: Skin is warm and dry. Neurological:      General: No focal deficit present. Mental Status: She is alert and oriented to person, place, and time. Assessment and Plan:          1. Chronic pain of right knee  -     MRI KNEE RIGHT WO CONTRAST; Future  [facility]     [address] [facility]  2. Screening for lipid disorders  -     Lipid, Fasting; Future  3. Near syncope   Try electrolyte fluids and watch sugars during these episodes. Follow up if no improvement. Return for Follow up if symptoms persist or worsen. Patient given educational materials - see patient instructions. Discussed use, benefit, and side effects of prescribed medications. All patient questions answered. Pt voiced understanding. Reviewed health maintenance. Instructed to continue current medications, diet and exercise. Patient agreed with treatment plan. Follow up as directed.      Electronically signed by GO Mahajan on 2/3/2023 at 8:49 AM

## 2023-02-10 ENCOUNTER — HOSPITAL ENCOUNTER (OUTPATIENT)
Dept: MRI IMAGING | Age: 43
Discharge: HOME | End: 2023-02-10
Payer: COMMERCIAL

## 2023-02-10 DIAGNOSIS — G89.29 CHRONIC PAIN OF RIGHT KNEE: ICD-10-CM

## 2023-02-10 DIAGNOSIS — M25.561 CHRONIC PAIN OF RIGHT KNEE: ICD-10-CM

## 2023-02-10 PROCEDURE — 73721 MRI JNT OF LWR EXTRE W/O DYE: CPT

## 2023-02-10 NOTE — RESULT ENCOUNTER NOTE
Medial meniscus shows wear, not any tear and inflammation/cartilage damage under the knee cap. These are both treated with exercise.  She can see ortho for opinion if she wants

## 2023-02-17 ENCOUNTER — OFFICE VISIT (OUTPATIENT)
Dept: ORTHOPEDIC SURGERY | Age: 43
End: 2023-02-17
Payer: COMMERCIAL

## 2023-02-17 VITALS — RESPIRATION RATE: 16 BRPM | HEIGHT: 64 IN | WEIGHT: 139 LBS | BODY MASS INDEX: 23.73 KG/M2

## 2023-02-17 DIAGNOSIS — M76.51 PATELLAR TENDINITIS OF RIGHT KNEE: Primary | ICD-10-CM

## 2023-02-17 PROCEDURE — G8420 CALC BMI NORM PARAMETERS: HCPCS | Performed by: ORTHOPAEDIC SURGERY

## 2023-02-17 PROCEDURE — G8484 FLU IMMUNIZE NO ADMIN: HCPCS | Performed by: ORTHOPAEDIC SURGERY

## 2023-02-17 PROCEDURE — G8427 DOCREV CUR MEDS BY ELIG CLIN: HCPCS | Performed by: ORTHOPAEDIC SURGERY

## 2023-02-17 PROCEDURE — 1036F TOBACCO NON-USER: CPT | Performed by: ORTHOPAEDIC SURGERY

## 2023-02-17 PROCEDURE — 99204 OFFICE O/P NEW MOD 45 MIN: CPT | Performed by: ORTHOPAEDIC SURGERY

## 2023-02-17 RX ORDER — DICLOFENAC SODIUM 75 MG/1
75 TABLET, DELAYED RELEASE ORAL 2 TIMES DAILY WITH MEALS
Qty: 28 TABLET | Refills: 0 | Status: SHIPPED | OUTPATIENT
Start: 2023-02-17 | End: 2023-03-03

## 2023-02-17 NOTE — PROGRESS NOTES
Orthopedic Knee Encounter Note     Chief complaint: Right knee pain    HPI: Jose Colbert is a 44 y.o. female who presents for evaluation of her right knee. She has been having pain for over a year now. No precipitating trauma or injury. Pain is primarily localized to the anterior aspect of her knee. Initially she is try doing some quadriceps type strengthening exercises on her own but failing to improve she saw her PCP who got x-rays and then sent her to physical therapy. Therapy did help but she continued to have anterior knee pain with squats and anytime she went up or downstairs. Consequently an MRI study was ordered and she was referred to my clinic for further evaluation and treatment. She denies having any associated swelling or mechanical symptoms. Previous treatment:    NSAIDs: None    Injections: None    Physical therapy: No    Surgeries: None    Review of Systems:     Constitution: no fever or chills   Pain level: 3/10  Musculoskeletal: As noted in the HPI   Neurologic: no neurologic symptoms    Past Medical History  Saroj Paz  has a past medical history of Painful menstrual periods. Past Surgical History  Saroj Paz  has a past surgical history that includes [place] tooth extraction; Endometrial ablation; and Dilation and curettage of uterus (5/9/14). Current Medications  Current Outpatient Medications   Medication Sig Dispense Refill    Calcium Carb-Cholecalciferol 500-10 MG-MCG TABS Take 500 mg by mouth daily      CRANBERRY PO Take  by mouth.      calcium carbonate (OSCAL) 500 MG TABS tablet Take 500 mg by mouth daily. No current facility-administered medications for this visit. Allergies  Allergies have been reviewed. Saroj Paz has No Known Allergies. Social History  Anamika  reports that she quit smoking about 16 years ago. Her smoking use included cigarettes. She has a 7.00 pack-year smoking history.  She has never used smokeless tobacco. She reports current alcohol use of about 3.3 standard drinks per week. She reports that she does not use drugs. Family History  Anamika's family history includes Birth Defects in her maternal grandmother; Diabetes in her father and paternal grandfather; Heart Disease in her maternal grandfather. Physical Exam:     Resp 16   Ht 5' 4\" (1.626 m)   Wt 139 lb (63 kg)   BMI 23.86 kg/m²    General Appearance: alert, well appearing, and in no distress  Mental Status: alert, oriented to person, place, and time  Gait: normal    Knee: Bilateral    Skin: warm and dry, no rash or erythema  Vasculature: 2+ pedal pulses bilaterally  Neuro: Sensation grossly intact to light touch diffusely  Alignment: Normal  Tenderness: Tender to palpation along the proximal aspect of the patellar tendon of the right knee    ROM: (Degrees)    Right   A P   Left   A P    Extension  0    Extension  0   Flexion   130    Flexion   130      Crepitation  No    Crepitation  No      Muscle strength:    Right       Left    Flexion   5    Flexion   5  Extension  5    Extension  5  SLR   5    SLR   5    Extensor lag   n    Extensor lag  n      Special testing:    Right          Left    n    Pain with deep knee flexion   n  n    Patellar grind     n  n    Patellar apprehension    n  n    Patellar glide     n    n    Lachman     n  n    Anterior drawer    n  n    Pivot shift     n  n    Posterior drawer    n  n    Dial test     n  n    Posterolateral drawer    n  n    Posterior Sag     n  n    MCL      n  n    LCL      n    n    Medial joint line tenderness   n  n    Lateral joint line tenderness   n  n    Appley's     n      Imaging:  X-rays of the right knee completed on 10/12/2022 were reviewed independently demonstrating normal joint spaces without obvious fracture, dislocation, subluxation or notable osseous abnormality. MRI: MRI of the right knee completed on 2/10/2023 was viewed independently demonstrating normal chondral surfaces and menisci. Intact collateral and cruciate ligaments. Normal and intact quadriceps and patellar tendons. Impression/Plan:     Alka Jack is a 44 y.o. old female with right knee patellar tendinitis. I did have a discussion with the patient today educating her about this condition and discussed treatment options available to her. I recommended proceeding conservatively at this time with a course of physical therapy focusing on eccentric strengthening of the quadriceps as well as hip abductor and external rotator strengthening exercises. They may also perform some iontophoresis. She was also placed on a 2-week course of Voltaren a prescription of which was electronically sent to her pharmacy. Finally we had a discussion about wearing a jumpers knee brace while active. I anticipate gradual improvement and resolution of her symptoms with this treatment and so I will see her back for reevaluation as needed but she may certainly return or call at anytime with persistent or worsening symptoms or with any questions or concerns.       NA = Not assessed  n = No  y = Yes  SLR = Straight leg raise  MCL = Medial collateral ligament  LCL = Lateral collateral ligament

## 2023-06-02 ENCOUNTER — OFFICE VISIT (OUTPATIENT)
Dept: PRIMARY CARE CLINIC | Age: 43
End: 2023-06-02
Payer: COMMERCIAL

## 2023-06-02 VITALS
OXYGEN SATURATION: 99 % | HEART RATE: 84 BPM | BODY MASS INDEX: 23.56 KG/M2 | SYSTOLIC BLOOD PRESSURE: 114 MMHG | HEIGHT: 64 IN | DIASTOLIC BLOOD PRESSURE: 68 MMHG | WEIGHT: 138 LBS

## 2023-06-02 DIAGNOSIS — D23.5 BENIGN NEOPLASM OF SKIN OF TRUNK: ICD-10-CM

## 2023-06-02 DIAGNOSIS — L82.0 INFLAMED SEBORRHEIC KERATOSIS: ICD-10-CM

## 2023-06-02 PROCEDURE — 99213 OFFICE O/P EST LOW 20 MIN: CPT | Performed by: FAMILY MEDICINE

## 2023-06-02 PROCEDURE — 1036F TOBACCO NON-USER: CPT | Performed by: FAMILY MEDICINE

## 2023-06-02 PROCEDURE — G8427 DOCREV CUR MEDS BY ELIG CLIN: HCPCS | Performed by: FAMILY MEDICINE

## 2023-06-02 PROCEDURE — G8420 CALC BMI NORM PARAMETERS: HCPCS | Performed by: FAMILY MEDICINE

## 2023-06-02 SDOH — ECONOMIC STABILITY: FOOD INSECURITY: WITHIN THE PAST 12 MONTHS, YOU WORRIED THAT YOUR FOOD WOULD RUN OUT BEFORE YOU GOT MONEY TO BUY MORE.: NEVER TRUE

## 2023-06-02 SDOH — ECONOMIC STABILITY: FOOD INSECURITY: WITHIN THE PAST 12 MONTHS, THE FOOD YOU BOUGHT JUST DIDN'T LAST AND YOU DIDN'T HAVE MONEY TO GET MORE.: NEVER TRUE

## 2023-06-02 SDOH — ECONOMIC STABILITY: INCOME INSECURITY: HOW HARD IS IT FOR YOU TO PAY FOR THE VERY BASICS LIKE FOOD, HOUSING, MEDICAL CARE, AND HEATING?: NOT HARD AT ALL

## 2023-06-02 SDOH — ECONOMIC STABILITY: HOUSING INSECURITY
IN THE LAST 12 MONTHS, WAS THERE A TIME WHEN YOU DID NOT HAVE A STEADY PLACE TO SLEEP OR SLEPT IN A SHELTER (INCLUDING NOW)?: NO

## 2023-08-14 ENCOUNTER — APPOINTMENT (OUTPATIENT)
Dept: CT IMAGING | Age: 43
End: 2023-08-14
Payer: COMMERCIAL

## 2023-08-14 ENCOUNTER — HOSPITAL ENCOUNTER (EMERGENCY)
Age: 43
Discharge: HOME | End: 2023-08-14
Attending: EMERGENCY MEDICINE
Payer: COMMERCIAL

## 2023-08-14 ENCOUNTER — APPOINTMENT (OUTPATIENT)
Dept: GENERAL RADIOLOGY | Age: 43
End: 2023-08-14
Payer: COMMERCIAL

## 2023-08-14 VITALS
TEMPERATURE: 97.2 F | DIASTOLIC BLOOD PRESSURE: 67 MMHG | BODY MASS INDEX: 24.03 KG/M2 | HEART RATE: 89 BPM | WEIGHT: 140 LBS | SYSTOLIC BLOOD PRESSURE: 115 MMHG | RESPIRATION RATE: 14 BRPM | OXYGEN SATURATION: 100 %

## 2023-08-14 DIAGNOSIS — R07.89 ATYPICAL CHEST PAIN: Primary | ICD-10-CM

## 2023-08-14 LAB
ALBUMIN SERPL-MCNC: 5 G/DL (ref 3.5–5.2)
ALBUMIN/GLOB SERPL: 1.9 {RATIO} (ref 1–2.5)
ALP SERPL-CCNC: 61 U/L (ref 35–104)
ALT SERPL-CCNC: 38 U/L (ref 5–33)
ANION GAP SERPL CALCULATED.3IONS-SCNC: 13 MMOL/L (ref 9–17)
AST SERPL-CCNC: 30 U/L
BASOPHILS # BLD: 0.06 K/UL (ref 0–0.2)
BASOPHILS NFR BLD: 1 % (ref 0–2)
BILIRUB SERPL-MCNC: 0.6 MG/DL (ref 0.3–1.2)
BUN SERPL-MCNC: 12 MG/DL (ref 6–20)
CALCIUM SERPL-MCNC: 9.4 MG/DL (ref 8.6–10.4)
CHLORIDE SERPL-SCNC: 100 MMOL/L (ref 98–107)
CO2 SERPL-SCNC: 24 MMOL/L (ref 20–31)
CREAT SERPL-MCNC: 0.7 MG/DL (ref 0.5–0.9)
EOSINOPHIL # BLD: 0.45 K/UL (ref 0–0.44)
EOSINOPHILS RELATIVE PERCENT: 7 % (ref 1–4)
ERYTHROCYTE [DISTWIDTH] IN BLOOD BY AUTOMATED COUNT: 11.6 % (ref 11.8–14.4)
GFR SERPL CREATININE-BSD FRML MDRD: >60 ML/MIN/1.73M2
GLUCOSE SERPL-MCNC: 79 MG/DL (ref 70–99)
HCG SERPL QL: NEGATIVE
HCT VFR BLD AUTO: 40.6 % (ref 36.3–47.1)
HGB BLD-MCNC: 13.4 G/DL (ref 11.9–15.1)
IMM GRANULOCYTES # BLD AUTO: <0.03 K/UL (ref 0–0.3)
IMM GRANULOCYTES NFR BLD: 0 %
LYMPHOCYTES NFR BLD: 1.66 K/UL (ref 1.1–3.7)
LYMPHOCYTES RELATIVE PERCENT: 27 % (ref 24–43)
MAGNESIUM SERPL-MCNC: 2 MG/DL (ref 1.6–2.6)
MCH RBC QN AUTO: 33.1 PG (ref 25.2–33.5)
MCHC RBC AUTO-ENTMCNC: 33 G/DL (ref 28.4–34.8)
MCV RBC AUTO: 100.2 FL (ref 82.6–102.9)
MONOCYTES NFR BLD: 0.46 K/UL (ref 0.1–1.2)
MONOCYTES NFR BLD: 8 % (ref 3–12)
NEUTROPHILS NFR BLD: 57 % (ref 36–65)
NEUTS SEG NFR BLD: 3.47 K/UL (ref 1.5–8.1)
NRBC BLD-RTO: 0 PER 100 WBC
PLATELET # BLD AUTO: 252 K/UL (ref 138–453)
PMV BLD AUTO: 8.3 FL (ref 8.1–13.5)
POTASSIUM SERPL-SCNC: 3.8 MMOL/L (ref 3.7–5.3)
PROT SERPL-MCNC: 7.7 G/DL (ref 6.4–8.3)
RBC # BLD AUTO: 4.05 M/UL (ref 3.95–5.11)
SODIUM SERPL-SCNC: 137 MMOL/L (ref 135–144)
TROPONIN I SERPL HS-MCNC: <6 NG/L (ref 0–14)
TROPONIN I SERPL HS-MCNC: <6 NG/L (ref 0–14)
WBC OTHER # BLD: 6.1 K/UL (ref 3.5–11.3)

## 2023-08-14 PROCEDURE — 96375 TX/PRO/DX INJ NEW DRUG ADDON: CPT | Performed by: EMERGENCY MEDICINE

## 2023-08-14 PROCEDURE — 84703 CHORIONIC GONADOTROPIN ASSAY: CPT

## 2023-08-14 PROCEDURE — 71045 X-RAY EXAM CHEST 1 VIEW: CPT

## 2023-08-14 PROCEDURE — 93005 ELECTROCARDIOGRAM TRACING: CPT | Performed by: STUDENT IN AN ORGANIZED HEALTH CARE EDUCATION/TRAINING PROGRAM

## 2023-08-14 PROCEDURE — 84484 ASSAY OF TROPONIN QUANT: CPT

## 2023-08-14 PROCEDURE — 85025 COMPLETE CBC W/AUTO DIFF WBC: CPT

## 2023-08-14 PROCEDURE — 99285 EMERGENCY DEPT VISIT HI MDM: CPT | Performed by: EMERGENCY MEDICINE

## 2023-08-14 PROCEDURE — 96374 THER/PROPH/DIAG INJ IV PUSH: CPT | Performed by: EMERGENCY MEDICINE

## 2023-08-14 PROCEDURE — 83735 ASSAY OF MAGNESIUM: CPT

## 2023-08-14 PROCEDURE — 71275 CT ANGIOGRAPHY CHEST: CPT

## 2023-08-14 PROCEDURE — 6370000000 HC RX 637 (ALT 250 FOR IP): Performed by: STUDENT IN AN ORGANIZED HEALTH CARE EDUCATION/TRAINING PROGRAM

## 2023-08-14 PROCEDURE — 80053 COMPREHEN METABOLIC PANEL: CPT

## 2023-08-14 PROCEDURE — 2580000003 HC RX 258: Performed by: STUDENT IN AN ORGANIZED HEALTH CARE EDUCATION/TRAINING PROGRAM

## 2023-08-14 PROCEDURE — 6360000004 HC RX CONTRAST MEDICATION: Performed by: STUDENT IN AN ORGANIZED HEALTH CARE EDUCATION/TRAINING PROGRAM

## 2023-08-14 PROCEDURE — 6360000002 HC RX W HCPCS: Performed by: STUDENT IN AN ORGANIZED HEALTH CARE EDUCATION/TRAINING PROGRAM

## 2023-08-14 RX ORDER — ONDANSETRON 2 MG/ML
4 INJECTION INTRAMUSCULAR; INTRAVENOUS ONCE
Status: COMPLETED | OUTPATIENT
Start: 2023-08-14 | End: 2023-08-14

## 2023-08-14 RX ORDER — MORPHINE SULFATE 4 MG/ML
4 INJECTION INTRAVENOUS ONCE
Status: COMPLETED | OUTPATIENT
Start: 2023-08-14 | End: 2023-08-14

## 2023-08-14 RX ORDER — 0.9 % SODIUM CHLORIDE 0.9 %
1000 INTRAVENOUS SOLUTION INTRAVENOUS ONCE
Status: COMPLETED | OUTPATIENT
Start: 2023-08-14 | End: 2023-08-14

## 2023-08-14 RX ORDER — KETOROLAC TROMETHAMINE 30 MG/ML
30 INJECTION, SOLUTION INTRAMUSCULAR; INTRAVENOUS ONCE
Status: COMPLETED | OUTPATIENT
Start: 2023-08-14 | End: 2023-08-14

## 2023-08-14 RX ORDER — ONDANSETRON 4 MG/1
4 TABLET, FILM COATED ORAL EVERY 8 HOURS PRN
Qty: 10 TABLET | Refills: 0 | Status: SHIPPED | OUTPATIENT
Start: 2023-08-14

## 2023-08-14 RX ORDER — ASPIRIN 81 MG/1
324 TABLET, CHEWABLE ORAL ONCE
Status: COMPLETED | OUTPATIENT
Start: 2023-08-14 | End: 2023-08-14

## 2023-08-14 RX ADMIN — IOPAMIDOL 100 ML: 755 INJECTION, SOLUTION INTRAVENOUS at 11:09

## 2023-08-14 RX ADMIN — MORPHINE SULFATE 4 MG: 4 INJECTION INTRAVENOUS at 11:45

## 2023-08-14 RX ADMIN — ONDANSETRON 4 MG: 2 INJECTION INTRAMUSCULAR; INTRAVENOUS at 12:59

## 2023-08-14 RX ADMIN — ASPIRIN 81 MG 324 MG: 81 TABLET ORAL at 10:05

## 2023-08-14 RX ADMIN — SODIUM CHLORIDE 1000 ML: 9 INJECTION, SOLUTION INTRAVENOUS at 11:45

## 2023-08-14 RX ADMIN — KETOROLAC TROMETHAMINE 30 MG: 30 INJECTION, SOLUTION INTRAMUSCULAR; INTRAVENOUS at 10:06

## 2023-08-14 ASSESSMENT — ENCOUNTER SYMPTOMS
VOMITING: 0
COLOR CHANGE: 0
RHINORRHEA: 0
BACK PAIN: 1
SHORTNESS OF BREATH: 1
DIARRHEA: 0
NAUSEA: 0
ABDOMINAL PAIN: 0

## 2023-08-14 ASSESSMENT — PAIN DESCRIPTION - ONSET: ONSET: AWAKENED FROM SLEEP

## 2023-08-14 ASSESSMENT — PAIN DESCRIPTION - PAIN TYPE: TYPE: ACUTE PAIN

## 2023-08-14 ASSESSMENT — PAIN DESCRIPTION - ORIENTATION
ORIENTATION: LEFT;OUTER;UPPER
ORIENTATION: LEFT;OUTER;UPPER

## 2023-08-14 ASSESSMENT — PAIN DESCRIPTION - DESCRIPTORS
DESCRIPTORS: STABBING
DESCRIPTORS: STABBING

## 2023-08-14 ASSESSMENT — PAIN SCALES - GENERAL
PAINLEVEL_OUTOF10: 4
PAINLEVEL_OUTOF10: 3
PAINLEVEL_OUTOF10: 4

## 2023-08-14 ASSESSMENT — HEART SCORE: ECG: 0

## 2023-08-14 ASSESSMENT — PAIN DESCRIPTION - LOCATION
LOCATION: BACK
LOCATION: BACK

## 2023-08-14 ASSESSMENT — PAIN - FUNCTIONAL ASSESSMENT: PAIN_FUNCTIONAL_ASSESSMENT: 0-10

## 2023-08-14 ASSESSMENT — PAIN DESCRIPTION - FREQUENCY: FREQUENCY: CONTINUOUS

## 2023-08-14 NOTE — ED TRIAGE NOTES
Pt complains of chest pain starting this morning, pt complains of left riley eback pain , pt complains of left side shoulder blade pain

## 2023-08-14 NOTE — ED NOTES
Mjedicated for nausea by other caregiver  Given emesis bag, no emesis at this time     Joelle Das, IVONNE  08/14/23 1257

## 2023-08-14 NOTE — ED NOTES
The following labs were obtained by [employer] student, labeled with appropriate pt sticker and tubed to lab: by me    [x] Blue     [x] Lavender   [] on ice  [x] Green/yellow  [x] Green/black [] on ice  [] Dewitte Dominique  [] on ice  [] Yellow  [] Red  [] Type/ Screen  [] ABG  [] VBG    [] COVID-19 swab    [] Rapid  [] PCR  [] Flu swab  [] Peds Viral Panel     [] Urine Sample  [] Fecal Sample  [] Pelvic Cultures  [] Blood Cultures  [] X 2  [] STREP Cultures       Becca Ahmadi RN  08/14/23 1007

## 2023-08-14 NOTE — ED NOTES
The following labs were labeled with appropriate pt sticker and tubed to lab: by me    [] Blue     [] Lavender   [] on ice  [x] Green/yellow #2 Trop  [] Green/black [] on ice  [] Marnie Shaw  [] on ice  [] Yellow  [] Red  [] Type/ Screen  [] ABG  [] VBG    [] COVID-19 swab    [] Rapid  [] PCR  [] Flu swab  [] Peds Viral Panel     [] Urine Sample  [] Fecal Sample  [] Pelvic Cultures  [] Blood Cultures  [] X 2  [] STREP Cultures       Andrea Avina RN  08/14/23 6301

## 2023-08-14 NOTE — ED NOTES
Temporary report received from Ramos Smith, [address]. Pt back from restroom, placed back on monitor.      Mary Brower RN  08/14/23 5438

## 2023-08-14 NOTE — ED PROVIDER NOTES
[facility] ED  Emergency Department Encounter  Emergency Medicine Resident     Pt Name:Anamika Tijerina  MRN: [id number]  [address] [facility] 1980  Date of evaluation: 23  PCP:  Uziel Davis MD  Note Started: 9:41 AM EDT      CHIEF COMPLAINT       Chief Complaint   Patient presents with    Chest Pain    Back Pain     Started  at 7am, intermittent, stabbing, worse on expiration       HISTORY OF PRESENT ILLNESS  (Location/Symptom, Timing/Onset, Context/Setting, Quality, Duration, Modifying Factors, Severity.)      Ken Mayorga is a 44 y.o. female who presents with chest pain and back pain. Past medical history significant for near syncope as well as osteopenia. Patient states that she woke up this morning with some back pain that developed and progressed into chest pain. Admits some shortness of breath as well as lightheadedness but denies any nausea vomiting or diaphoresis. Denies any recent surgeries, immobilization, fever or chills. Denies any one-sided leg swelling, hemoptysis, recent surgeries or immobilization. Denies any family history of connective tissue disorders. Patient describes it as a sharp squeezing-like\chest pain. Denies any recent trauma. PAST MEDICAL / SURGICAL / SOCIAL / FAMILY HISTORY      has a past medical history of Painful menstrual periods. has a past surgical history that includes [place] tooth extraction; Endometrial ablation; and Dilation and curettage of uterus (14).       Social History     Socioeconomic History    Marital status:      Spouse name: Not on file    Number of children: Not on file    Years of education: Not on file    Highest education level: Not on file   Occupational History    Not on file   Tobacco Use    Smoking status: Former     Packs/day: 1.00     Years: 7.00     Pack years: 7.00     Types: Cigarettes     Quit date: 2007     Years since quittin.6    Smokeless tobacco: Never   Substance and Sexual Activity

## 2023-08-14 NOTE — ED NOTES
Discharge instructions given. Verbalized understanding. All questions answered.   Back pain decreased to 1, chest pain 0/10  Was able to tolerate box lunch with no N/V   at side     Elizabeth Castillo RN  08/14/23 8076

## 2023-08-14 NOTE — ED NOTES
Patient to room 06  Patient is a 44year old female  Patient complains of back pain that started at 0700 then chest pain started  Back pain is continouos 3/10 ranging up to 10/10  Patient presents with chest pain to  midsternal, non-radiating  Denies n/v. Denies dyspnea. Skin pink, warm, dry. Patient placed on cardiac monitor, BP cuff and pulse ox. Alarms set. Patient given call light, will continue to monitor  NAD  Pt alert and oriented x4, talking in complete sentences, respirations even and unlabored. Pt acting age appropriate.    White board updated, will continue to asses, call light at side  [place] given       Piero Mosley RN  08/14/23 1000

## 2023-08-15 ENCOUNTER — OFFICE VISIT (OUTPATIENT)
Dept: PRIMARY CARE CLINIC | Age: 43
End: 2023-08-15
Payer: COMMERCIAL

## 2023-08-15 VITALS
DIASTOLIC BLOOD PRESSURE: 72 MMHG | SYSTOLIC BLOOD PRESSURE: 118 MMHG | HEIGHT: 64 IN | HEART RATE: 82 BPM | BODY MASS INDEX: 23.73 KG/M2 | OXYGEN SATURATION: 98 % | WEIGHT: 139 LBS

## 2023-08-15 DIAGNOSIS — R00.2 HEART PALPITATIONS: Primary | ICD-10-CM

## 2023-08-15 DIAGNOSIS — M89.8X9 BONY PROMINENCE: ICD-10-CM

## 2023-08-15 DIAGNOSIS — H69.82 ETD (EUSTACHIAN TUBE DYSFUNCTION), LEFT: ICD-10-CM

## 2023-08-15 DIAGNOSIS — R55 NEAR SYNCOPE: ICD-10-CM

## 2023-08-15 LAB
EKG ATRIAL RATE: 86 BPM
EKG P AXIS: 63 DEGREES
EKG P-R INTERVAL: 128 MS
EKG Q-T INTERVAL: 372 MS
EKG QRS DURATION: 80 MS
EKG QTC CALCULATION (BAZETT): 445 MS
EKG R AXIS: 79 DEGREES
EKG T AXIS: 8 DEGREES
EKG VENTRICULAR RATE: 86 BPM

## 2023-08-15 PROCEDURE — 93010 ELECTROCARDIOGRAM REPORT: CPT | Performed by: INTERNAL MEDICINE

## 2023-08-15 PROCEDURE — 99215 OFFICE O/P EST HI 40 MIN: CPT | Performed by: STUDENT IN AN ORGANIZED HEALTH CARE EDUCATION/TRAINING PROGRAM

## 2023-08-15 ASSESSMENT — ENCOUNTER SYMPTOMS
VOMITING: 0
WHEEZING: 0
SHORTNESS OF BREATH: 0
DIARRHEA: 0
ABDOMINAL PAIN: 0
NAUSEA: 0
BLOOD IN STOOL: 0
BACK PAIN: 1

## 2023-08-15 NOTE — PROGRESS NOTES
Former     Packs/day: 1.00     Years: 7.00     Pack years: 7.00     Types: Cigarettes     Quit date: 2007     Years since quittin.6    Smokeless tobacco: Never   Substance Use Topics    Alcohol use: Yes     Alcohol/week: 3.3 standard drinks     Types: 4 Standard drinks or equivalent per week     Comment: 1 time per month      Current Outpatient Medications   Medication Sig Dispense Refill    ondansetron (ZOFRAN) 4 MG tablet Take 1 tablet by mouth every 8 hours as needed for Nausea 10 tablet 0    Calcium Carb-Cholecalciferol 500-10 MG-MCG TABS Take 500 mg by mouth daily      calcium carbonate (OSCAL) 500 MG TABS tablet Take 1 tablet by mouth daily       No current facility-administered medications for this visit. No Known Allergies    Health Maintenance   Topic Date Due    Varicella vaccine (1 of 2 - 2-dose childhood series) Never done    HIV screen  Never done    Hepatitis C screen  Never done    Lipids  Never done    Cervical cancer screen  2022    Flu vaccine (1) Never done    Depression Screen  2024    DTaP/Tdap/Td vaccine (2 - Td or Tdap) 2024    COVID-19 Vaccine  Completed    Hepatitis A vaccine  Aged Out    Hib vaccine  Aged Out    Meningococcal (ACWY) vaccine  Aged Out    Pneumococcal 0-64 years Vaccine  Aged Out       Subjective:      Review of Systems   Constitutional:  Negative for chills, fatigue and fever. Respiratory:  Negative for shortness of breath and wheezing. Cardiovascular:  Negative for chest pain. Gastrointestinal:  Negative for abdominal pain, blood in stool, diarrhea, nausea and vomiting. Genitourinary:  Negative for hematuria. Musculoskeletal:  Positive for back pain. Skin:  Negative for rash and wound. Neurological:  Positive for light-headedness. Negative for numbness. Psychiatric/Behavioral:  Negative for dysphoric mood and sleep disturbance. The patient is not nervous/anxious.       Objective:     /72   Pulse 82   Ht 5' 4\" (1.626 m)

## 2023-08-25 ENCOUNTER — HOSPITAL ENCOUNTER (OUTPATIENT)
Age: 43
End: 2023-08-25
Attending: STUDENT IN AN ORGANIZED HEALTH CARE EDUCATION/TRAINING PROGRAM
Payer: COMMERCIAL

## 2023-08-25 DIAGNOSIS — R00.2 HEART PALPITATIONS: ICD-10-CM

## 2023-08-25 DIAGNOSIS — R55 NEAR SYNCOPE: ICD-10-CM

## 2023-08-25 LAB
STRESS BASELINE DIAS BP: 73 MMHG
STRESS BASELINE HR: 80 BPM
STRESS BASELINE SYS BP: 123 MMHG
STRESS ESTIMATED WORKLOAD: 10 METS
STRESS PEAK DIAS BP: 68 MMHG
STRESS PEAK SYS BP: 157 MMHG
STRESS PERCENT HR ACHIEVED: 93 %
STRESS POST PEAK HR: 166 BPM
STRESS RATE PRESSURE PRODUCT: NORMAL BPM*MMHG
STRESS TARGET HR: 178 BPM

## 2023-08-25 PROCEDURE — 93016 CV STRESS TEST SUPVJ ONLY: CPT | Performed by: INTERNAL MEDICINE

## 2023-08-25 PROCEDURE — 93018 CV STRESS TEST I&R ONLY: CPT | Performed by: INTERNAL MEDICINE

## 2023-08-25 PROCEDURE — 93017 CV STRESS TEST TRACING ONLY: CPT

## 2023-08-25 RX ORDER — ALBUTEROL SULFATE 90 UG/1
2 AEROSOL, METERED RESPIRATORY (INHALATION) PRN
Status: DISCONTINUED | OUTPATIENT
Start: 2023-08-25 | End: 2023-08-25

## 2023-08-25 RX ORDER — ATROPINE SULFATE 0.1 MG/ML
0.5 INJECTION INTRAVENOUS EVERY 5 MIN PRN
Status: DISCONTINUED | OUTPATIENT
Start: 2023-08-25 | End: 2023-08-25

## 2023-08-25 RX ORDER — METOPROLOL TARTRATE 5 MG/5ML
5 INJECTION INTRAVENOUS EVERY 5 MIN PRN
Status: DISCONTINUED | OUTPATIENT
Start: 2023-08-25 | End: 2023-08-25

## 2023-08-25 RX ORDER — SODIUM CHLORIDE 0.9 % (FLUSH) 0.9 %
5-40 SYRINGE (ML) INJECTION PRN
Status: DISCONTINUED | OUTPATIENT
Start: 2023-08-25 | End: 2023-08-25

## 2023-08-25 RX ORDER — SODIUM CHLORIDE 9 MG/ML
500 INJECTION, SOLUTION INTRAVENOUS CONTINUOUS PRN
Status: DISCONTINUED | OUTPATIENT
Start: 2023-08-25 | End: 2023-08-25

## 2023-08-25 RX ORDER — NITROGLYCERIN 0.4 MG/1
0.4 TABLET SUBLINGUAL EVERY 5 MIN PRN
Status: DISCONTINUED | OUTPATIENT
Start: 2023-08-25 | End: 2023-08-25

## 2023-08-29 ENCOUNTER — TELEPHONE (OUTPATIENT)
Dept: PRIMARY CARE CLINIC | Age: 43
End: 2023-08-29

## 2023-10-04 ENCOUNTER — OFFICE VISIT (OUTPATIENT)
Dept: PRIMARY CARE CLINIC | Age: 43
End: 2023-10-04
Payer: COMMERCIAL

## 2023-10-04 VITALS
SYSTOLIC BLOOD PRESSURE: 128 MMHG | WEIGHT: 136.4 LBS | OXYGEN SATURATION: 99 % | HEIGHT: 64 IN | HEART RATE: 112 BPM | DIASTOLIC BLOOD PRESSURE: 86 MMHG | BODY MASS INDEX: 23.29 KG/M2

## 2023-10-04 DIAGNOSIS — R00.2 PALPITATIONS: ICD-10-CM

## 2023-10-04 PROCEDURE — 99213 OFFICE O/P EST LOW 20 MIN: CPT | Performed by: FAMILY MEDICINE

## 2023-10-04 PROCEDURE — G8427 DOCREV CUR MEDS BY ELIG CLIN: HCPCS | Performed by: FAMILY MEDICINE

## 2023-10-04 PROCEDURE — G8420 CALC BMI NORM PARAMETERS: HCPCS | Performed by: FAMILY MEDICINE

## 2023-10-04 PROCEDURE — G8484 FLU IMMUNIZE NO ADMIN: HCPCS | Performed by: FAMILY MEDICINE

## 2023-10-04 PROCEDURE — 1036F TOBACCO NON-USER: CPT | Performed by: FAMILY MEDICINE

## 2023-10-04 NOTE — PROGRESS NOTES
[id number] [facility]  [address] [place] [address]  Dept: [phone]    Fidel Herrera is a 44 y.o. female Established patient, who presents today for her medical conditions/complaints as noted below. Chief Complaint   Patient presents with    Discuss Results     Pt here today to discuss stress test results    Flu Vaccine     Declined       HPI:   Pt had some pain and palps and had stress test.  Gets them after eating and also sometimes at night- can't sleep because it feels like heart is pounding out of her chest. Also with minimal exercise like walking up steps. Similar to what she had last year when we did the echo and holter.       Reviewed prior notes: None   Reviewed previous:  Labs and Imaging    No results found for: \"LDLCHOLESTEROL\", \"LDLCALC\"    (goal LDL is <100)   AST (U/L)   Date Value   2023 30     ALT (U/L)   Date Value   2023 38 (H)     BUN (mg/dL)   Date Value   2023 12     TSH (uIU/mL)   Date Value   2022 1.53     BP Readings from Last 3 Encounters:   10/04/23 116/82   08/15/23 118/72   23 115/67          (goal 120/80)    Past Medical History:   Diagnosis Date    Painful menstrual periods       Past Surgical History:   Procedure Laterality Date    DILATION AND CURETTAGE OF UTERUS  15    with balloon ablation and Thermachoice    ENDOMETRIAL ABLATION      attempted only, not proper insrumentation    WISDOM TOOTH EXTRACTION         Family History   Problem Relation Age of Onset    Diabetes Paternal Grandfather     Diabetes Father     Birth Defects Maternal Grandmother         leukemia    Heart Disease Maternal Grandfather        Social History     Tobacco Use    Smoking status: Former     Packs/day: 1.00     Years: 7.00     Additional pack years: 0.00     Total pack years: 7.00     Types: Cigarettes     Quit date: 2007     Years since quittin.7    Smokeless tobacco: Never   Substance Use Topics

## 2024-01-03 PROBLEM — S01.511A LACERATION OF VERMILION BORDER OF UPPER LIP: Status: ACTIVE | Noted: 2024-01-03

## 2024-01-03 PROBLEM — S01.511A: Status: ACTIVE | Noted: 2024-01-03

## 2024-10-02 ENCOUNTER — NURSE ONLY (OUTPATIENT)
Dept: PRIMARY CARE CLINIC | Age: 44
End: 2024-10-02
Payer: COMMERCIAL

## 2024-10-02 VITALS — SYSTOLIC BLOOD PRESSURE: 136 MMHG | DIASTOLIC BLOOD PRESSURE: 90 MMHG

## 2024-10-02 DIAGNOSIS — Z23 NEED FOR VACCINATION: Primary | ICD-10-CM

## 2024-10-02 PROCEDURE — 90715 TDAP VACCINE 7 YRS/> IM: CPT | Performed by: FAMILY MEDICINE

## 2024-10-02 PROCEDURE — 90471 IMMUNIZATION ADMIN: CPT | Performed by: FAMILY MEDICINE

## 2024-10-02 NOTE — PROGRESS NOTES
After obtaining consent, and per orders of Dr. Pozo, injection of Boostrix vaccine given in left deltoid by Denisha Moreno MA. Patient handled the injection well.